# Patient Record
Sex: MALE | Race: WHITE | NOT HISPANIC OR LATINO | Employment: OTHER | URBAN - METROPOLITAN AREA
[De-identification: names, ages, dates, MRNs, and addresses within clinical notes are randomized per-mention and may not be internally consistent; named-entity substitution may affect disease eponyms.]

---

## 2021-05-18 ENCOUNTER — HOSPITAL ENCOUNTER (OUTPATIENT)
Dept: PHYSICAL THERAPY | Facility: CLINIC | Age: 76
Setting detail: RECURRING SERIES
Discharge: HOME | End: 2021-05-19
Attending: INTERNAL MEDICINE

## 2022-10-20 NOTE — PROGRESS NOTES
"Consult (Skin lesion on chest )            History of Present Illness  Mikey Webster is a 77 y.o. male who presents to [facility] as a consult  for chest skin lesion, he had a biopsy in august. Came back as basal cell carcinoma. Has history of basal cell on scalp, face and right arm. Non smoker. Does take aspirin. Sees Dr. Alicea as cardiologist. Patient is okay to do this procedure in office.    Subjective       Morphine  Allergies Reconciled.    Review of Systems    All review of system has been reviewed and it  is negative except the ones note above.     Objective     /96   Pulse 74   Temp 98.1 °F (36.7 °C)   Ht 182.9 cm (72\")   Wt 82.3 kg (181 lb 8 oz)   SpO2 96%   BMI 24.62 kg/m²     Body mass index is 24.62 kg/m².    Physical Exam   Cardiovascular: Normal rate.     Pulmonary/Chest  Effort normal.     Chest    2.5 cm x 2 cm red slightly raised scar on right side of chest    Result Review :       Procedures  RECON- IN OFFICE EXCISION OF BASAL CELL CARCINOMA ON RIGHT SIDE OF CHEST 1HR.       Assessment and Plan {CC Problem List  Visit Diagnosis  ROS  Review (Popup)  [employer] Maintenance  Quality  BestPractice  Medications  SmartSets  SnapShot Encounters  Media :23}     Diagnoses and all orders for this visit:    1. Basal cell carcinoma (BCC) of skin of right breast (Primary)        Plan:  Discussed risk and benefits of re-excision of basal cell carinoma including bleeding, infection,  and scarring. Patient wishes to proceed. Will submit to insurance and schedule for in-office excision. RTC for procedure.       Scribed by Marilee Pardo MA, acting as a scribe for MCKAYLA Ambrocio, 10/25/22 14:41 EDT.  MCKAYLA Ambrocio's signature on the note affirms that the note adequately documents the care provided.  Patient was given instructions and counseling regarding his condition. Please see specific information pulled into the AVS if " appropriate.     Kalani Covarrubias, APRN  10/25/2022

## 2022-10-25 ENCOUNTER — OFFICE VISIT (OUTPATIENT)
Dept: PLASTIC SURGERY | Facility: CLINIC | Age: 77
End: 2022-10-25

## 2022-10-25 VITALS
TEMPERATURE: 98.1 F | HEART RATE: 74 BPM | OXYGEN SATURATION: 96 % | WEIGHT: 181.5 LBS | SYSTOLIC BLOOD PRESSURE: 153 MMHG | DIASTOLIC BLOOD PRESSURE: 96 MMHG | HEIGHT: 72 IN | BODY MASS INDEX: 24.58 KG/M2

## 2022-10-25 DIAGNOSIS — C44.511 BASAL CELL CARCINOMA (BCC) OF SKIN OF RIGHT BREAST: Primary | ICD-10-CM

## 2022-10-25 PROBLEM — I10 HYPERTENSIVE DISORDER: Status: ACTIVE | Noted: 2021-11-09

## 2022-10-25 PROBLEM — E04.2 MULTINODULAR GOITER: Status: ACTIVE | Noted: 2021-05-06

## 2022-10-25 PROBLEM — Z86.73 HISTORY OF CEREBROVASCULAR ACCIDENT: Status: ACTIVE | Noted: 2021-07-13

## 2022-10-25 PROBLEM — I73.00 RAYNAUD'S DISEASE: Status: ACTIVE | Noted: 2018-02-13

## 2022-10-25 PROBLEM — M25.569 PAIN IN JOINT, LOWER LEG: Status: ACTIVE | Noted: 2022-10-25

## 2022-10-25 PROBLEM — E61.1 IRON DEFICIENCY: Status: ACTIVE | Noted: 2021-07-14

## 2022-10-25 PROBLEM — Z86.718 HISTORY OF DEEP VEIN THROMBOSIS: Status: ACTIVE | Noted: 2018-02-13

## 2022-10-25 PROCEDURE — 99204 OFFICE O/P NEW MOD 45 MIN: CPT | Performed by: NURSE PRACTITIONER

## 2022-10-25 RX ORDER — NIRMATRELVIR AND RITONAVIR 300-100 MG
KIT ORAL
COMMUNITY
Start: 2022-09-22 | End: 2022-11-14

## 2022-10-25 RX ORDER — DEXTROMETHORPHAN HYDROBROMIDE AND PROMETHAZINE HYDROCHLORIDE 15; 6.25 MG/5ML; MG/5ML
SYRUP ORAL
COMMUNITY
Start: 2022-09-28 | End: 2022-11-14

## 2022-10-25 RX ORDER — PREDNISONE 20 MG/1
TABLET ORAL
COMMUNITY
Start: 2022-09-23 | End: 2022-11-14

## 2022-10-25 RX ORDER — ASPIRIN 81 MG/1
TABLET ORAL
COMMUNITY

## 2022-10-25 RX ORDER — ASCORBIC ACID 100 MG
TABLET,CHEWABLE ORAL
COMMUNITY
End: 2022-11-29

## 2022-10-25 RX ORDER — AMOXICILLIN 500 MG/1
500 CAPSULE ORAL EVERY 8 HOURS
COMMUNITY
Start: 2022-10-07 | End: 2022-11-14

## 2022-10-25 RX ORDER — UBIDECARENONE 75 MG
CAPSULE ORAL
COMMUNITY
End: 2022-11-14

## 2022-10-25 RX ORDER — LISINOPRIL 20 MG/1
TABLET ORAL
COMMUNITY
End: 2022-11-29

## 2022-10-25 RX ORDER — ATORVASTATIN CALCIUM 40 MG/1
TABLET, FILM COATED ORAL
COMMUNITY
End: 2022-11-29

## 2022-10-25 RX ORDER — LOSARTAN POTASSIUM 100 MG/1
TABLET ORAL
COMMUNITY
Start: 2022-10-07

## 2022-10-25 RX ORDER — IBUPROFEN 800 MG/1
TABLET ORAL
COMMUNITY

## 2022-10-26 ENCOUNTER — PATIENT ROUNDING (BHMG ONLY) (OUTPATIENT)
Dept: PLASTIC SURGERY | Facility: CLINIC | Age: 77
End: 2022-10-26

## 2022-10-26 NOTE — PROGRESS NOTES
A [email or web address] message has been sent to the patient for PATIENT ROUNDING with [facility].

## 2022-11-07 NOTE — PROGRESS NOTES
"Procedure (In office excision of right chest basal cell)            History of Present Illness  Mikey Webster is a 77 y.o. male who presents to [facility] for in office excision of right chest skin lesion.      Subjective       Morphine  Allergies Reconciled.    Review of Systems    All review of system has been reviewed and it  is negative except the ones note above.     Objective     /92 (BP Location: Left arm, Patient Position: Sitting)   Pulse 56   Ht 182.9 cm (72.01\")   Wt 86 kg (189 lb 9.6 oz)   SpO2 97%   BMI 25.71 kg/m²     Body mass index is 25.71 kg/m².    Physical Exam   Cardiovascular: Normal rate.     Pulmonary/Chest  Effort normal.     Chest    2.5 cm x 2 cm red slightly raised scar on right side of chest    Result Review :       Procedures  Excision Procedure: Consent obtained. Local injected to site, Lidocaine 1% with epinephrine.  Site prepped with ChloraPrep  in sterile fashion. Site draped in sterile fashion. I dissected down through skin and subcutaneous tissue completely around  Visible lesion, after excision of  was sent from field for pathology. Established hemostasis with direct pressure. Site was thoroughly irrigated. Site closed with 3-0 monocryl in a subcutaneous fashion to obliterate dead space, then with 4-0 monocryl in an intracuticular fashion. Site cleaned with sterile normal saline. Steri-strips and mastisol applied. The patient tolerated the procedure well with no immediate complications. 3 cm in length       Assessment and Plan      Diagnoses and all orders for this visit:    1. Basal cell carcinoma (BCC) of skin of right breast (Primary)  -     Tissue Pathology Exam  -     traMADol (Ultram) 50 MG tablet; Take 1-2 tablets by mouth Every 6 (Six) Hours As Needed for Moderate Pain.  Dispense: 20 tablet; Refill: 0    Other orders  -     amoxicillin-clavulanate (Augmentin) 875-125 MG per tablet; Take 1 tablet by mouth 2 " (Two) Times a Day for 10 days.  Dispense: 20 tablet; Refill: 0        Plan:    Keep incision clean, dry, and intact. May shower after 48 hours. RTC 2 weeks. Antibiotics sent in per patient request due to knee replacement in the past. Ultram sent for moderate pain.             Scribed by MCKAYLA Ambrocio, acting as a scribe for MCKAYLA Ambrocio, 11/14/22 15:38 EST.  MCKAYLA Ambrocio's signature on the note affirms that the note adequately documents the care provided.    [phone] [id number]    Patient was given instructions and counseling regarding his condition. Please see specific information pulled into the AVS if appropriate.     MCKAYLA Ambrocio  11/14/2022

## 2022-11-14 ENCOUNTER — PROCEDURE VISIT (OUTPATIENT)
Dept: PLASTIC SURGERY | Facility: CLINIC | Age: 77
End: 2022-11-14

## 2022-11-14 VITALS
HEART RATE: 56 BPM | DIASTOLIC BLOOD PRESSURE: 92 MMHG | SYSTOLIC BLOOD PRESSURE: 149 MMHG | WEIGHT: 189.6 LBS | HEIGHT: 72 IN | OXYGEN SATURATION: 97 % | BODY MASS INDEX: 25.68 KG/M2

## 2022-11-14 DIAGNOSIS — C44.511 BASAL CELL CARCINOMA (BCC) OF SKIN OF RIGHT BREAST: Primary | ICD-10-CM

## 2022-11-14 PROCEDURE — 11603 EXC TR-EXT MAL+MARG 2.1-3 CM: CPT | Performed by: NURSE PRACTITIONER

## 2022-11-14 PROCEDURE — 88305 TISSUE EXAM BY PATHOLOGIST: CPT | Performed by: NURSE PRACTITIONER

## 2022-11-14 PROCEDURE — 12032 INTMD RPR S/A/T/EXT 2.6-7.5: CPT | Performed by: NURSE PRACTITIONER

## 2022-11-14 RX ORDER — AMOXICILLIN AND CLAVULANATE POTASSIUM 875; 125 MG/1; MG/1
1 TABLET, FILM COATED ORAL 2 TIMES DAILY
Qty: 20 TABLET | Refills: 0 | Status: SHIPPED | OUTPATIENT
Start: 2022-11-14 | End: 2022-11-24

## 2022-11-14 RX ORDER — TRAMADOL HYDROCHLORIDE 50 MG/1
50-100 TABLET ORAL EVERY 6 HOURS PRN
Qty: 20 TABLET | Refills: 0 | Status: SHIPPED | OUTPATIENT
Start: 2022-11-14 | End: 2022-11-29

## 2022-11-16 LAB
CYTO UR: NORMAL
LAB AP CASE REPORT: NORMAL
LAB AP CLINICAL INFORMATION: NORMAL
PATH REPORT.FINAL DX SPEC: NORMAL
PATH REPORT.GROSS SPEC: NORMAL

## 2022-11-17 ENCOUNTER — TELEPHONE (OUTPATIENT)
Dept: PLASTIC SURGERY | Facility: CLINIC | Age: 77
End: 2022-11-17

## 2022-11-17 NOTE — TELEPHONE ENCOUNTER
Called and spoke with patient over path results. Patient is aware and will see us at his scheduled office visit.

## 2022-11-17 NOTE — TELEPHONE ENCOUNTER
----- Message from MCKAYLA Ambrocio sent at 11/17/2022 10:33 AM EST -----  Please let him know margins negative so need for further excision

## 2022-11-23 NOTE — PROGRESS NOTES
"Chief Complaint  Follow-up (Steri strip removal)    Subjective          History of Present Illness  Mikey Webster is a 77 y.o. male who presents to [facility] for Postoperative Follow-Up of right chest skin lesion excision.    Here for steri strip removal. Denies pain. No open areas. Doing well.  Pathology:  Final Diagnosis   Skin, right breast, excision               - Basal cell carcinoma, margins negative            Allergies: Morphine  Allergies Reconciled.    Review of Systems   All review of system has been reviewed and it  is negative except the ones note above.     Objective     /76 (BP Location: Left arm, Patient Position: Sitting, Cuff Size: Adult)   Pulse (!) 48   Temp 98.4 °F (36.9 °C) (Temporal)   Ht 182.9 cm (72.01\")   Wt 85.1 kg (187 lb 9.6 oz)   SpO2 97%   BMI 25.44 kg/m²     Body mass index is 25.44 kg/m².    Physical Exam   Cardiovascular: Normal rate.     Pulmonary/Chest  Effort normal.      Chest  Incision healing well, no swelling, no erythema, no drainage    Result Review :                Assessment and Plan      Diagnoses and all orders for this visit:    1. Basal cell carcinoma (BCC) of skin of right breast (Primary)        Plan:  • Steri strips removed, antibiotic ointment applied, Telfa pad and tape. RTO in 2m for follow up. Patient reports he is seeing cardiology today due to low heart rate, take a betablocker and may stop it. He notes he feels fine today and has no symptoms. He will monitor skin for any new lesions.         Follow Up     Return for 2m for follow up.    Patient was given instructions and counseling regarding his condition. Please see specific information pulled into the AVS if appropriate.     Kalani Covarrubias, APRN  11/29/2022  "

## 2022-11-29 ENCOUNTER — OFFICE VISIT (OUTPATIENT)
Dept: PLASTIC SURGERY | Facility: CLINIC | Age: 77
End: 2022-11-29

## 2022-11-29 ENCOUNTER — OFFICE VISIT (OUTPATIENT)
Dept: CARDIOLOGY | Facility: CLINIC | Age: 77
End: 2022-11-29

## 2022-11-29 VITALS
BODY MASS INDEX: 25.41 KG/M2 | TEMPERATURE: 98.4 F | HEIGHT: 72 IN | WEIGHT: 187.6 LBS | SYSTOLIC BLOOD PRESSURE: 132 MMHG | DIASTOLIC BLOOD PRESSURE: 76 MMHG | OXYGEN SATURATION: 97 % | HEART RATE: 48 BPM

## 2022-11-29 VITALS
WEIGHT: 190 LBS | SYSTOLIC BLOOD PRESSURE: 135 MMHG | HEART RATE: 55 BPM | HEIGHT: 73 IN | BODY MASS INDEX: 25.18 KG/M2 | DIASTOLIC BLOOD PRESSURE: 70 MMHG

## 2022-11-29 DIAGNOSIS — C44.511 BASAL CELL CARCINOMA (BCC) OF SKIN OF RIGHT BREAST: Primary | ICD-10-CM

## 2022-11-29 DIAGNOSIS — Z86.73 HISTORY OF CVA IN ADULTHOOD: ICD-10-CM

## 2022-11-29 DIAGNOSIS — I10 ESSENTIAL HYPERTENSION: ICD-10-CM

## 2022-11-29 DIAGNOSIS — R00.0 TACHYCARDIA: Primary | ICD-10-CM

## 2022-11-29 PROCEDURE — 93000 ELECTROCARDIOGRAM COMPLETE: CPT | Performed by: INTERNAL MEDICINE

## 2022-11-29 PROCEDURE — 99203 OFFICE O/P NEW LOW 30 MIN: CPT | Performed by: INTERNAL MEDICINE

## 2022-11-29 PROCEDURE — 99212 OFFICE O/P EST SF 10 MIN: CPT | Performed by: NURSE PRACTITIONER

## 2022-11-29 NOTE — PROGRESS NOTES
Chief Complaint  Irregular Heart Beat    Subjective            Mikey Webster presents to [facility] CARD[facility]OLOG[facility]  History of Present Illness  Mr. Webster is a new patient who was referred here by his PCP (MCKAYLA Hendricks) due to tachycardia and some recent episodes of irregular heart beats.  He states he had COVID-19 infection in September and had elevated heart rates up to the 120s during that time, but did not experience any palpitations.  He did have significant generalized fatigue, but states he is now recovered well from COVID and has resumed all his normal physical activities without any problems.  He does not report any history of chest pain/pressure or exertional dyspnea.  Likewise, no orthopnea, PND, peripheral edema, lightheadedness, or syncope reported.  He was actually bradycardic in the office today and his ECG showed sinus bradycardia with a heart rate of 57 bpm and early R/S transition in the precordial leads, but was otherwise unremarkable.  Mr. Webster has not noticed any elevated heart rates/tachycardia in recent weeks, but he was started on beta blocker therapy with metoprolol tartrate after he had COVID.  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  No arrhythmias were observed on his telemetry monitor while he was hospitalized with the CVA, and he does not report any known history of arrhythmias or other cardiac problems.  An echocardiogram obtained at that time revealed normal left ventricular systolic function with an estimated ejection fraction of 60-65%, grade I diastolic dysfunction, and mild mitral regurgitation, but no hemodynamically significant valvular pathology.  His BP was 135/70 today.  Mr. Webster is a former smoker, but quit smoking in 2020.     Past Medical History:   Diagnosis Date   • Hypertension    • Stroke (HCC) 04/2021       No past surgical history on file.    Social History     Tobacco Use   • Smoking status: Former     Types:  Cigarettes     Quit date:      Years since quittin.9   • Smokeless tobacco: Never   Vaping Use   • Vaping Use: Never used   Substance Use Topics   • Alcohol use: Yes     Alcohol/week: 1.0 standard drink     Types: 1 Glasses of wine per week   • Drug use: Never       Family History   Problem Relation Age of Onset   • Lung cancer Father         Current Outpatient Medications on File Prior to Visit   Medication Sig   • aspirin 81 MG EC tablet aspirin 81 mg tablet,delayed release   Take 1 tablet every day by oral route.   • ibuprofen (ADVIL,MOTRIN) 800 MG tablet ibuprofen 800 mg tablet   TAKE 1 TABLET BY MOUTH EVERY 8 HOURS AS NEEDED FOR PAIN   • losartan (COZAAR) 100 MG tablet losartan 100 mg tablet   TAKE 1 TABLET BY MOUTH EVERY DAY   • metoprolol tartrate (LOPRESSOR) 25 MG tablet Every 12 (Twelve) Hours.   • [DISCONTINUED] Ascorbic Acid (Vitamin C) 100 MG chewable tablet Vitamin C   • [DISCONTINUED] atorvastatin (LIPITOR) 40 MG tablet atorvastatin 40 mg tablet   TAKE 1 TABLET BY MOUTH AT BEDTIME   • [DISCONTINUED] lisinopril (PRINIVIL,ZESTRIL) 20 MG tablet lisinopril 20 mg tablet   TAKE 1 TABLET BY MOUTH EVERY DAY   • [DISCONTINUED] metoprolol tartrate (LOPRESSOR) 25 MG tablet Take 1 tablet by mouth 2 (Two) Times a Day.   • [DISCONTINUED] traMADol (Ultram) 50 MG tablet Take 1-2 tablets by mouth Every 6 (Six) Hours As Needed for Moderate Pain.     No current facility-administered medications on file prior to visit.       Allergies   Allergen Reactions   • Morphine Other (See Comments)     Unsure, when in [employer], was told he had a reaction       Review of Systems   Constitutional: Negative for chills, fatigue, fever and unexpected weight gain.   HENT: Positive for trouble swallowing. Negative for hearing loss, nosebleeds and sore throat.    Eyes: Negative for pain and visual disturbance.   Respiratory: Positive for cough. Negative for shortness of breath and wheezing.    Cardiovascular: Negative for chest pain,  "palpitations and leg swelling.   Gastrointestinal: Negative for abdominal pain, blood in stool and vomiting.   Endocrine: Negative for cold intolerance and heat intolerance.   Genitourinary: Negative for dysuria and hematuria.   Musculoskeletal: Positive for arthralgias. Negative for joint swelling and myalgias.   Skin: Negative for color change, pallor and rash.   Neurological: Negative for tremors, syncope, speech difficulty, weakness and light-headedness.   Hematological: Negative for adenopathy. Does not bruise/bleed easily.        Objective     /70   Pulse 55   Ht 185.4 cm (73\")   Wt 86.2 kg (190 lb)   BMI 25.07 kg/m²       Physical Exam  Constitutional:       General: He is not in acute distress.     Appearance: Normal appearance.   HENT:      Head: Atraumatic.      Mouth/Throat:      Mouth: Mucous membranes are moist.      Pharynx: Oropharynx is clear. No oropharyngeal exudate.   Eyes:      General: No scleral icterus.     Conjunctiva/sclera: Conjunctivae normal.   Neck:      Vascular: No carotid bruit or JVD.   Cardiovascular:      Rate and Rhythm: Normal rate and regular rhythm.  No extrasystoles are present.     Chest Wall: PMI is not displaced.      Pulses:           Radial pulses are 2+ on the right side and 2+ on the left side.      Heart sounds: S1 normal and S2 normal. No murmur heard.    No friction rub. No gallop. No S3 or S4 sounds.   Pulmonary:      Effort: Pulmonary effort is normal. No tachypnea or respiratory distress.      Breath sounds: No decreased breath sounds, wheezing, rhonchi or rales.   Chest:      Chest wall: No tenderness.   Abdominal:      General: Bowel sounds are normal. There is no distension.      Palpations: Abdomen is soft. There is no mass.      Tenderness: There is no abdominal tenderness.   Musculoskeletal:         General: No swelling, tenderness or deformity.      Cervical back: Neck supple. No tenderness.      Right lower leg: No edema.      Left lower leg: No " edema.   Skin:     General: Skin is warm and dry.      Coloration: Skin is not jaundiced.      Findings: No erythema or rash.      Nails: There is no clubbing.   Neurological:      General: No focal deficit present.      Mental Status: He is alert and oriented to person, place, and time.      Motor: No weakness.   Psychiatric:         Mood and Affect: Mood normal.         Behavior: Behavior normal.       Result Review :     The following data was reviewed by: Db Alicea MD on 11/29/2022:    Echocardiogram 4/25/21:  CONCLUSIONS:    1.  Normal left ventricular systolic function with an estimated ejection        fraction of 60-65%.  No regional wall motion abnormalities were observed.        Mild concentric left ventricular hypertrophy.    2.  Grade 1 diastolic dysfunction with elevated left ventricular filling        pressure.    3.  Mildly dilated left atrium.    4.  Mild mitral regurgitation and mild tricuspid regurgitation, but no        hemodynamically significant valvular disease.      ECG 12 Lead    Date/Time: 11/29/2022 4:08 PM  Performed by: Db Alicea MD  Authorized by: Db Alicea MD   Comparison: compared with previous ECG from 4/25/2021  Similar to previous ECG  Rhythm: sinus bradycardia  Rate: bradycardic  BPM: 57  Conduction: conduction normal  ST Segments: ST segments normal  T Waves: T waves normal  QRS axis: normal  Other findings comments: early R/S transition in the precordial leads  Comments: Sinus bradycardia with a heart rate of 57 bpm and early R/S transition in the precordial leads.  No significant change was observed from the patient's prior ECG in April 2021.        Assessment and Plan      Diagnoses and all orders for this visit:    1. Tachycardia (Primary)  -     Cardiac Event Monitor; Future  -     ECG 12 Lead    2. History of CVA in adulthood  -     Cardiac Event Monitor; Future  -     ECG 12 Lead    3. Essential hypertension  -     ECG 12 Lead    -Recent  intermittent tachycardia with history of CVA in 2021:  I will obtain a 3-week event monitor to evaluate for potential paroxysmal arrhythmias, including atrial fibrillation.  If the event monitor is unremarkable, I would not recommend further cardiac testing at this time.  I recommended that he hold therapy with metoprolol for now due to baseline sinus bradycardia (with a heart rate down into the mid 40s at his other physician appointment earlier today).      -Hypertension:  BP adequately controlled on current therapy with losartan 100 mg daily; continue same.        Follow Up     No follow-ups on file.    Patient was given instructions and counseling regarding his condition or for health maintenance advice. Please see specific information pulled into the AVS if appropriate.     Mikey Webster  reports that he quit smoking about 2 years ago. His smoking use included cigarettes. He has never used smokeless tobacco.  I have educated him on the risk of diseases from using tobacco products such as cancer, COPD and heart disease.  Continued tobacco cessation was advised.      Db Alicea MD, FACC, [facility]  Interventional Cardiology

## 2022-12-02 ENCOUNTER — PATIENT ROUNDING (BHMG ONLY) (OUTPATIENT)
Dept: CARDIOLOGY | Facility: CLINIC | Age: 77
End: 2022-12-02

## 2022-12-02 NOTE — PROGRESS NOTES
December 2, 2022    Hello, may I speak with Mikey Webster?    My name is MORTEZA     I am  with [facility] CARDIOLOG[facility]  [address] [place] [address] [phone].    Before we get started may I verify your date of birth? 1945    I am calling to officially welcome you to our practice and ask about your recent visit. Is this a good time to talk? No, LEFT VM

## 2023-01-11 ENCOUNTER — TELEPHONE (OUTPATIENT)
Dept: CARDIOLOGY | Facility: CLINIC | Age: 78
End: 2023-01-11
Payer: MEDICARE

## 2023-01-11 NOTE — TELEPHONE ENCOUNTER
----- Message from Adwoa Roblero sent at 1/11/2023 10:50 AM EST -----    ----- Message -----  From: Radha Almonte APRN  Sent: 1/9/2023   8:27 PM EST  To: Adwoa Roblero    Mr. Webster is heart rate stayed in a normal sinus rhythm, with an average heart rate of 82 during his study.  He did have 2 short episodes of proximal SVT, the longest was 5 beats long.  There were no episodes of atrial fibrillation, atrial flutter, or ventricular arrhythmias.   If he was having heavy symptoms of palpitation or racing heart rate we could consider restarting his metoprolol, however Dr. Alicea did note when he was seen in November that his heart rate was on the lower side, so unless he is symptomatic, it would be reasonable to continue to hold the metoprolol.

## 2023-01-16 NOTE — PROGRESS NOTES
"Chief Complaint  Post-op Follow-up (2 month follow up from skin lesion excision on chest 11/14/22.)    Subjective          History of Present Illness  Mikey Webster is a 77 y.o. male who presents to [facility] for 2 month Postoperative Follow-Up of right chest skin lesion excision on 11/14/22.    Pt states no issues or concerns.  Healed nicely.    Clinical Information    Basal cell carcinoma (BCC) of skin of right breast   Final Diagnosis   Skin, right breast, excision               - Basal cell carcinoma, margins negative      Electronically signed by Marah Cheatham DO on 11/16/2022 at 1054   Gross Description    1. Breast, Right.  The specimen is received in 1 formalin filled container labeled \"right breast basal cell carcinoma\" and consists of a 3.0 x 1.6 cm unoriented skin ellipse excised to a depth of 0.3 cm.  The tan, wrinkled, hairbearing skin displays a 2.0 x 1.0 cm tan-white, flat, scarred lesion, involving the nearest skin margin (inked blue).  Sectioning reveals the lesion has a tan, ill-defined cut surface that grossly extends 0.3 cm deep, coming to within 0.2 cm of the deep margin.  The specimen is entirely submitted in 3 cassettes, to include the opposing tips in 1A and lesion involving the skin margin, perpendicular, in 1C. BLOSSOM           Allergies: Morphine  Allergies Reconciled.    Review of Systems   All review of system has been reviewed and it  is negative except the ones note above.     Objective     /79 (BP Location: Right arm, Patient Position: Sitting)   Pulse 51   Temp 98.7 °F (37.1 °C) (Temporal)   Ht 185.4 cm (72.99\")   Wt 89.4 kg (197 lb)   SpO2 98%   BMI 26.00 kg/m²     Body mass index is 26 kg/m².    Physical Exam   Cardiovascular: Normal rate.     Pulmonary/Chest  Effort normal.      Chest  Right breast incision healed well, flat and soft, no open areas, no lesion or growth at site    Result Review :              "   Assessment and Plan     Diagnoses and all orders for this visit:    1. Basal cell carcinoma (BCC) of skin of right breast (Primary)        Plan:  Photos obtained today. Start scar massage with Aquaphor. No sun exposure for up to 1 year. RTC as needed per patient request. Happy with results, will monitor for any reoccurrence, and will follow up if needed.    Scribed by Marilee Pardo MA, acting as a scribe for MCKAYLA Ambrocio, 01/24/23 11:15 EST.  MCKAYLA Ambrocio's signature on the note affirms that the note adequately documents the care provided.        Patient was given instructions and counseling regarding his condition. Please see specific information pulled into the AVS if appropriate.     Marilee Pardo MA  01/24/2023

## 2023-01-24 ENCOUNTER — OFFICE VISIT (OUTPATIENT)
Dept: PLASTIC SURGERY | Facility: CLINIC | Age: 78
End: 2023-01-24
Payer: MEDICARE

## 2023-01-24 VITALS
DIASTOLIC BLOOD PRESSURE: 79 MMHG | WEIGHT: 197 LBS | OXYGEN SATURATION: 98 % | HEART RATE: 51 BPM | BODY MASS INDEX: 26.11 KG/M2 | TEMPERATURE: 98.7 F | HEIGHT: 73 IN | SYSTOLIC BLOOD PRESSURE: 155 MMHG

## 2023-01-24 DIAGNOSIS — C44.511 BASAL CELL CARCINOMA (BCC) OF SKIN OF RIGHT BREAST: Primary | ICD-10-CM

## 2023-01-24 PROCEDURE — 99212 OFFICE O/P EST SF 10 MIN: CPT | Performed by: NURSE PRACTITIONER

## 2024-01-29 ENCOUNTER — OFFICE VISIT (OUTPATIENT)
Dept: CARDIOLOGY | Facility: CLINIC | Age: 79
End: 2024-01-29
Payer: MEDICARE

## 2024-01-29 VITALS
HEIGHT: 73 IN | BODY MASS INDEX: 28.23 KG/M2 | DIASTOLIC BLOOD PRESSURE: 72 MMHG | HEART RATE: 61 BPM | WEIGHT: 213 LBS | SYSTOLIC BLOOD PRESSURE: 134 MMHG

## 2024-01-29 DIAGNOSIS — R00.0 TACHYCARDIA: Primary | ICD-10-CM

## 2024-01-29 DIAGNOSIS — Z86.73 HISTORY OF CEREBROVASCULAR ACCIDENT: ICD-10-CM

## 2024-01-29 DIAGNOSIS — I10 ESSENTIAL HYPERTENSION: ICD-10-CM

## 2024-01-29 PROCEDURE — 1160F RVW MEDS BY RX/DR IN RCRD: CPT | Performed by: FAMILY MEDICINE

## 2024-01-29 PROCEDURE — 99214 OFFICE O/P EST MOD 30 MIN: CPT | Performed by: FAMILY MEDICINE

## 2024-01-29 PROCEDURE — 3075F SYST BP GE 130 - 139MM HG: CPT | Performed by: FAMILY MEDICINE

## 2024-01-29 PROCEDURE — 1159F MED LIST DOCD IN RCRD: CPT | Performed by: FAMILY MEDICINE

## 2024-01-29 PROCEDURE — 3078F DIAST BP <80 MM HG: CPT | Performed by: FAMILY MEDICINE

## 2024-01-29 PROCEDURE — 93000 ELECTROCARDIOGRAM COMPLETE: CPT | Performed by: FAMILY MEDICINE

## 2024-01-29 RX ORDER — ATORVASTATIN CALCIUM 40 MG/1
40 TABLET, FILM COATED ORAL DAILY
COMMUNITY

## 2024-01-29 NOTE — PROGRESS NOTES
Chief Complaint  Follow-up, Rapid Heart Rate, Hypertension, and [employer] clearance    Subjective        History of Present Illness  Mikey Webster presents to [facility] CARDIOLOGY   Mr. Webster is a 78-year-old male patient with medical history of hypertension, previous CVA in 2021, remote DVT, hyperlipidemia and history of tobacco use. He was previously seen and evaluated by Dr. Alicea for complaints of elevated heart rate following previous COVID infection.  Due to tachycardia following COVID, she was further treated with therapy, however this was stopped due to baseline sinus bradycardia.  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  He denies any arrhythmias on telemetry during that hospitalization, and he underwent subsequent cardiac event monitoring with no occult rhythms noted.  Cardiac wise- he has no complaints of chest pains, shortness of breath, palpitations, lightheadedness or dizziness.  He remains active, and has no residual effects from previous CVA.    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 No pertinent surgical history.     Social History  He  reports that he quit smoking about 3 years ago. His smoking use included cigarettes. He started smoking about 51 years ago. He has a 75.00 pack-year smoking history. He has never used smokeless tobacco. He reports current alcohol use of about 1.0 standard drink of alcohol per week. He reports that he does not use drugs.    Family History  His family history includes Lung cancer in his father.       Current Outpatient Medications on File Prior to Visit   Medication Sig    aspirin 81 MG EC tablet aspirin 81 mg tablet,delayed  "release   Take 1 tablet every day by oral route.    atorvastatin (LIPITOR) 40 MG tablet Take 1 tablet by mouth Daily.    losartan (COZAAR) 25 MG tablet Take 3 tablets by mouth Daily.    ibuprofen (ADVIL,MOTRIN) 800 MG tablet ibuprofen 800 mg tablet   TAKE 1 TABLET BY MOUTH EVERY 8 HOURS AS NEEDED FOR PAIN (Patient not taking: Reported on 1/29/2024)     No current facility-administered medications on file prior to visit.         Review of Systems   Constitutional:  Negative for fatigue.   Respiratory:  Negative for cough, chest tightness and shortness of breath.    Cardiovascular:  Negative for chest pain, palpitations and leg swelling.   Gastrointestinal:  Negative for nausea and vomiting.   Neurological:  Negative for dizziness and syncope.   Hematological:  Does not bruise/bleed easily.        Objective   Vitals:    01/29/24 1326   BP: 134/72   Pulse: 61   Weight: 96.6 kg (213 lb)   Height: 185.4 cm (72.99\")         Physical Exam  General : Alert, awake, no acute distress  Neck : Supple, no carotid bruit, no jugular venous distention  CVS : Regular rate and rhythm, normal S1 and S2, no murmur, rubs or gallops  Lungs: Clear to auscultation bilaterally, no crackles or rhonchi  Abdomen: Soft, nontender, bowel sounds active  Extremities: Warm, well-perfused, no pedal edema      Result Review     The following data was reviewed by MCKAYLA Olivo    Magnesium   Date Value Ref Range Status   04/26/2021 1.90 1.60 - 2.30 mg/dL Final      No results found for: \"DIGOXIN\"   Lab Results   Component Value Date    TROPONINT <0.01 04/25/2021            ECHOCARDIOGRAM    04/26/2021    CONCLUSIONS:    1.  Normal left ventricular systolic function with an estimated ejection        fraction of 60-65%.  No regional wall motion abnormalities were observed.        Mild concentric left ventricular hypertrophy.    2.  Grade 1 diastolic dysfunction with elevated left ventricular filling        pressure.    3.  Mildly dilated left " atrium.    4.  Mild mitral regurgitation and mild tricuspid regurgitation, but no        hemodynamically significant valvular disease.          Cardiac Event Monitor  11/30/2022  Sinus rhythm with an average heart rate of 82 bpm and rare sinus bradycardia and sinus tachycardia with minimum and maximum heart rates of 52 and 161 bpm, respectively.  Two short runs of paroxysmal SVT (probable atrial tachycardia) were observed, the longest of which was 5 beats in duration with a maximum heart rate of 130 bpm.  There was no evidence of atrial fibrillation/flutter or ventricular arrhythmias.  Rare isolated PVCs and rare isolated PACs and atrial couplets were observed.  No evidence of pauses or AV block.  The patient's reported symptoms (lightheadedness/syncope) occasionally correlated with sinus tachycardia, although there was not a consistent correlation.         ECG 12 Lead    Date/Time: 1/29/2024 1:37 PM  Performed by: Radha Almonte APRN    Authorized by: Radha Almonte APRN  Comparison: compared with previous ECG from 7/29/2022  Similar to previous ECG  Rhythm: sinus bradycardia  Rate: bradycardic  Conduction: conduction normal  ST Segments: ST segments normal  QRS axis: normal    Clinical impression: normal ECG                    Assessment and Plan   Diagnoses and all orders for this visit:    1. Tachycardia (Primary)  Assessment & Plan:  He had transient symptoms of tachycardia following COVID infection, which have resolved without any reoccurrence.  He underwent cardiac event monitor without any significant findings, beta-blocker was discontinued due to baseline bradycardia.  EKG in office today shows normal sinus rhythm with mild bradycardia.    Orders:  -     Cancel: ECG 12 Lead; Future  -     ECG 12 Lead    2. Essential hypertension  Assessment & Plan:  Blood pressure is well-controlled, continue current dose of losartan.  Previous echocardiogram with mild LVH, but no signs or symptoms of  decompensation.      3. History of cerebrovascular accident  Assessment & Plan:  History of right pontine CVA in April 2021.  No residual effects.  He had neurological workup as well as cardiac workup during hospitalization.  Cardiac workup did not show any occult rhythm contributing to CVA.  Continue daily aspirin and statin therapy per PCP management.              Follow Up   Return in about 1 year (around 1/29/2025) for with new MD d/t Dr Nixon huddleston.    Patient was given instructions and counseling regarding his condition or for health maintenance advice. Please see specific information pulled into the AVS if appropriate.     Signed,  Radha Almonte, APRN  01/29/2024     Dictated Utilizing Dragon Dictation: Please note that portions of this note were completed with a voice recognition program.  Part of this note may be an electronic transcription/translation of spoken language to printed text using the Dragon Dictation System.

## 2024-01-29 NOTE — LETTER
January 29, 2024       No Recipients    Patient: Mikey Webster   YOB: 1945   Date of Visit: 1/29/2024     Dear MCKAYLA Gar:       Thank you for referring Mikey Webster to me for evaluation. Below are the relevant portions of my assessment and plan of care.    If you have questions, please do not hesitate to call me. I look forward to following Mikey along with you.         Sincerely,        MCKAYLA Olivo        CC:   No Recipients    Radha Almonte APRN  01/29/24 1905  Addendum  Chief Complaint  Follow-up, Rapid Heart Rate, Hypertension, and [employer] clearance    Subjective       History of Present Illness  Mikey Webster presents to [facility] CARDIOLOGY   Mr. Webster is a 78-year-old male patient with medical history of hypertension, previous CVA in 2021, remote DVT, hyperlipidemia and history of tobacco use. He was previously seen and evaluated by Dr. Alicea for complaints of elevated heart rate following previous COVID infection.  Due to tachycardia following COVID, she was further treated with therapy, however this was stopped due to baseline sinus bradycardia.  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  He denies any arrhythmias on telemetry during that hospitalization, and he underwent subsequent cardiac event monitoring with no occult rhythms noted.  Cardiac wise- he has no complaints of chest pains, shortness of breath, palpitations, lightheadedness or dizziness.  He remains active, and has no residual effects from previous CVA.    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 No pertinent surgical history.     Social History  He  reports  "that he quit smoking about 3 years ago. His smoking use included cigarettes. He started smoking about 51 years ago. He has a 75.00 pack-year smoking history. He has never used smokeless tobacco. He reports current alcohol use of about 1.0 standard drink of alcohol per week. He reports that he does not use drugs.    Family History  His family history includes Lung cancer in his father.       Current Outpatient Medications on File Prior to Visit   Medication Sig   • aspirin 81 MG EC tablet aspirin 81 mg tablet,delayed release   Take 1 tablet every day by oral route.   • atorvastatin (LIPITOR) 40 MG tablet Take 1 tablet by mouth Daily.   • losartan (COZAAR) 25 MG tablet Take 3 tablets by mouth Daily.   • ibuprofen (ADVIL,MOTRIN) 800 MG tablet ibuprofen 800 mg tablet   TAKE 1 TABLET BY MOUTH EVERY 8 HOURS AS NEEDED FOR PAIN (Patient not taking: Reported on 1/29/2024)     No current facility-administered medications on file prior to visit.         Review of Systems   Constitutional:  Negative for fatigue.   Respiratory:  Negative for cough, chest tightness and shortness of breath.    Cardiovascular:  Negative for chest pain, palpitations and leg swelling.   Gastrointestinal:  Negative for nausea and vomiting.   Neurological:  Negative for dizziness and syncope.   Hematological:  Does not bruise/bleed easily.        Objective  Vitals:    01/29/24 1326   BP: 134/72   Pulse: 61   Weight: 96.6 kg (213 lb)   Height: 185.4 cm (72.99\")         Physical Exam  General : Alert, awake, no acute distress  Neck : Supple, no carotid bruit, no jugular venous distention  CVS : Regular rate and rhythm, normal S1 and S2, no murmur, rubs or gallops  Lungs: Clear to auscultation bilaterally, no crackles or rhonchi  Abdomen: Soft, nontender, bowel sounds active  Extremities: Warm, well-perfused, no pedal edema      Result Review    The following data was reviewed by MCKAYLA Olivo    Magnesium   Date Value Ref Range Status   04/26/2021 " "1.90 1.60 - 2.30 mg/dL Final      No results found for: \"DIGOXIN\"   Lab Results   Component Value Date    TROPONINT <0.01 04/25/2021            ECHOCARDIOGRAM    04/26/2021    CONCLUSIONS:    1.  Normal left ventricular systolic function with an estimated ejection        fraction of 60-65%.  No regional wall motion abnormalities were observed.        Mild concentric left ventricular hypertrophy.    2.  Grade 1 diastolic dysfunction with elevated left ventricular filling        pressure.    3.  Mildly dilated left atrium.    4.  Mild mitral regurgitation and mild tricuspid regurgitation, but no        hemodynamically significant valvular disease.          Cardiac Event Monitor  11/30/2022  Sinus rhythm with an average heart rate of 82 bpm and rare sinus bradycardia and sinus tachycardia with minimum and maximum heart rates of 52 and 161 bpm, respectively.  Two short runs of paroxysmal SVT (probable atrial tachycardia) were observed, the longest of which was 5 beats in duration with a maximum heart rate of 130 bpm.  There was no evidence of atrial fibrillation/flutter or ventricular arrhythmias.  Rare isolated PVCs and rare isolated PACs and atrial couplets were observed.  No evidence of pauses or AV block.  The patient's reported symptoms (lightheadedness/syncope) occasionally correlated with sinus tachycardia, although there was not a consistent correlation.         ECG 12 Lead    Date/Time: 1/29/2024 1:37 PM  Performed by: Radha Almotne APRN    Authorized by: Radha Almonte APRN  Comparison: compared with previous ECG from 7/29/2022  Similar to previous ECG  Rhythm: sinus bradycardia  Rate: bradycardic  Conduction: conduction normal  ST Segments: ST segments normal  QRS axis: normal    Clinical impression: normal ECG                    Assessment and Plan   Diagnoses and all orders for this visit:    1. Tachycardia (Primary)  Assessment & Plan:  He had transient symptoms of tachycardia following COVID " infection, which have resolved without any reoccurrence.  He underwent cardiac event monitor without any significant findings, beta-blocker was discontinued due to baseline bradycardia.  EKG in office today shows normal sinus rhythm with mild bradycardia.    Orders:  -     Cancel: ECG 12 Lead; Future  -     ECG 12 Lead    2. Essential hypertension  Assessment & Plan:  Blood pressure is well-controlled, continue current dose of losartan.  Previous echocardiogram with mild LVH, but no signs or symptoms of decompensation.      3. History of cerebrovascular accident  Assessment & Plan:  History of right pontine CVA in April 2021.  No residual effects.  He had neurological workup as well as cardiac workup during hospitalization.  Cardiac workup did not show any occult rhythm contributing to CVA.  Continue daily aspirin and statin therapy per PCP management.              Follow Up   Return in about 1 year (around 1/29/2025) for with new MD d/t Dr Nixon huddleston.    Patient was given instructions and counseling regarding his condition or for health maintenance advice. Please see specific information pulled into the AVS if appropriate.     Signed,  Radha Almonte, APRN  01/29/2024     Dictated Utilizing Dragon Dictation: Please note that portions of this note were completed with a voice recognition program.  Part of this note may be an electronic transcription/translation of spoken language to printed text using the Dragon Dictation System.

## 2024-01-30 NOTE — ASSESSMENT & PLAN NOTE
Blood pressure is well-controlled, continue current dose of losartan.  Previous echocardiogram with mild LVH, but no signs or symptoms of decompensation.  
He had transient symptoms of tachycardia following COVID infection, which have resolved without any reoccurrence.  He underwent cardiac event monitor without any significant findings, beta-blocker was discontinued due to baseline bradycardia.  EKG in office today shows normal sinus rhythm with mild bradycardia.  
History of right pontine CVA in April 2021.  No residual effects.  He had neurological workup as well as cardiac workup during hospitalization.  Cardiac workup did not show any occult rhythm contributing to CVA.  Continue daily aspirin and statin therapy per PCP management.  
patient

## 2024-04-03 ENCOUNTER — TRANSCRIBE ORDERS (OUTPATIENT)
Dept: ADMINISTRATIVE | Facility: HOSPITAL | Age: 79
End: 2024-04-03
Payer: MEDICARE

## 2024-04-03 DIAGNOSIS — I63.9 CEREBRAL INFARCTION, UNSPECIFIED MECHANISM: Primary | ICD-10-CM

## 2024-05-10 ENCOUNTER — HOSPITAL ENCOUNTER (OUTPATIENT)
Dept: MRI IMAGING | Facility: HOSPITAL | Age: 79
Discharge: HOME | End: 2024-05-10
Payer: MEDICARE

## 2024-05-10 DIAGNOSIS — I63.9 CEREBRAL INFARCTION, UNSPECIFIED MECHANISM: ICD-10-CM

## 2024-05-10 LAB
CREAT BLDA-MCNC: 0.6 MG/DL (ref 0.6–1.3)
EGFRCR SERPLBLD CKD-EPI 2021: 99.4 ML/MIN/1.73

## 2024-05-10 PROCEDURE — 0 GADOBENATE DIMEGLUMINE 529 MG/ML SOLUTION: Performed by: PSYCHIATRY & NEUROLOGY

## 2024-05-10 PROCEDURE — 70553 MRI BRAIN STEM W/O & W/DYE: CPT

## 2024-05-10 PROCEDURE — 82565 ASSAY OF CREATININE: CPT

## 2024-05-10 PROCEDURE — 70547 MR ANGIOGRAPHY NECK W/O DYE: CPT

## 2024-05-10 PROCEDURE — A9577 INJ MULTIHANCE: HCPCS | Performed by: PSYCHIATRY & NEUROLOGY

## 2024-05-10 RX ADMIN — GADOBENATE DIMEGLUMINE 20 ML: 529 INJECTION, SOLUTION INTRAVENOUS at 14:20

## 2024-06-26 ENCOUNTER — TELEPHONE (OUTPATIENT)
Dept: CARDIOLOGY | Facility: CLINIC | Age: 79
End: 2024-06-26
Payer: MEDICARE

## 2024-06-26 NOTE — TELEPHONE ENCOUNTER
Caller: Mikey Webster    Relationship: Self    Best call back number: [phone]    What form or medical record are you requesting: ALL OFFICE NOTES    Who is requesting this form or medical record from you: [employer]    How would you like to receive the form or medical records (pick-up, mail, fax):   If fax, what is the fax number:   If mail, what is the address:   If pick-up, provide patient with address and location details    Timeframe paperwork needed: ASAP    Additional notes: WILL BE IN [place] ON 6-27-24 IN THE AFTERNOON

## 2024-07-22 ENCOUNTER — APPOINTMENT (OUTPATIENT)
Dept: GENERAL RADIOLOGY | Facility: HOSPITAL | Age: 79
End: 2024-07-22
Payer: MEDICARE

## 2024-07-22 ENCOUNTER — HOSPITAL ENCOUNTER (EMERGENCY)
Facility: HOSPITAL | Age: 79
Discharge: HOME | End: 2024-07-22
Attending: EMERGENCY MEDICINE | Admitting: EMERGENCY MEDICINE
Payer: MEDICARE

## 2024-07-22 VITALS
DIASTOLIC BLOOD PRESSURE: 67 MMHG | HEART RATE: 64 BPM | WEIGHT: 206.35 LBS | OXYGEN SATURATION: 99 % | SYSTOLIC BLOOD PRESSURE: 154 MMHG | TEMPERATURE: 98 F | HEIGHT: 73 IN | BODY MASS INDEX: 27.35 KG/M2 | RESPIRATION RATE: 18 BRPM

## 2024-07-22 DIAGNOSIS — J32.9 SINUSITIS, UNSPECIFIED CHRONICITY, UNSPECIFIED LOCATION: ICD-10-CM

## 2024-07-22 DIAGNOSIS — J40 BRONCHITIS: Primary | ICD-10-CM

## 2024-07-22 LAB
FLUAV SUBTYP SPEC NAA+PROBE: NOT DETECTED
FLUBV RNA ISLT QL NAA+PROBE: NOT DETECTED
RSV RNA NPH QL NAA+NON-PROBE: NOT DETECTED
SARS-COV-2 RNA RESP QL NAA+PROBE: NOT DETECTED

## 2024-07-22 PROCEDURE — 99283 EMERGENCY DEPT VISIT LOW MDM: CPT

## 2024-07-22 PROCEDURE — 71045 X-RAY EXAM CHEST 1 VIEW: CPT

## 2024-07-22 PROCEDURE — 87637 SARSCOV2&INF A&B&RSV AMP PRB: CPT | Performed by: EMERGENCY MEDICINE

## 2024-07-22 RX ORDER — AZITHROMYCIN 250 MG/1
TABLET, FILM COATED ORAL
Qty: 6 TABLET | Refills: 0 | Status: SHIPPED | OUTPATIENT
Start: 2024-07-22 | End: 2024-07-26

## 2024-07-22 RX ORDER — BENZONATATE 200 MG/1
200 CAPSULE ORAL 3 TIMES DAILY PRN
Qty: 20 CAPSULE | Refills: 0 | Status: SHIPPED | OUTPATIENT
Start: 2024-07-22

## 2024-07-22 RX ORDER — PREDNISONE 50 MG/1
50 TABLET ORAL DAILY
Qty: 5 TABLET | Refills: 0 | Status: SHIPPED | OUTPATIENT
Start: 2024-07-22

## 2024-07-22 NOTE — ED PROVIDER NOTES
Time: 10:10 AM EDT  Date of encounter:  7/22/2024  Independent Historian/Clinical History and Information was obtained by:   Patient    History is limited by: N/A    Chief Complaint: Cough      History of Present Illness:  Patient is a 78 y.o. year old male who presents to the emergency department for evaluation of cough, nasal congestion that is gotten worse over the past 5 days.  Patient denies fever and chills.  Patient has no chest pain or shortness of breath.  Patient denies nausea, vomiting, and diarrhea.  Patient has no leg pain or swelling.    HPI    Patient Care Team  Primary Care Provider: Josefa Nieves APRN    Past Medical History:     Allergies   Allergen Reactions    Morphine Other (See Comments)     Unsure, when in [employer], was told he had a reaction     Past Medical History:   Diagnosis Date    Aneurysm 4/21    Cancer     Basel cell    Deep vein thrombosis August2005    Off blood thinners/ back to work 2/2006    Hypertension     Stroke 04/2021     No past surgical history on file.  Family History   Problem Relation Age of Onset    Lung cancer Father        Home Medications:  Prior to Admission medications    Medication Sig Start Date End Date Taking? Authorizing Provider   aspirin 81 MG EC tablet aspirin 81 mg tablet,delayed release   Take 1 tablet every day by oral route.    Darien Benton MD   atorvastatin (LIPITOR) 40 MG tablet Take 1 tablet by mouth Daily.    ProviderDarien MD   azithromycin (ZITHROMAX) 250 MG tablet Take 2 tablets by mouth Daily for 1 day, THEN 1 tablet Daily for 4 days. 7/22/24 7/26/24  Volodymyr Cruz MD   benzonatate (TESSALON) 200 MG capsule Take 1 capsule by mouth 3 (Three) Times a Day As Needed for Cough. 7/22/24   Volodymyr Cruz MD   ibuprofen (ADVIL,MOTRIN) 800 MG tablet ibuprofen 800 mg tablet   TAKE 1 TABLET BY MOUTH EVERY 8 HOURS AS NEEDED FOR PAIN  Patient not taking: Reported on 1/29/2024    ProviderDarien MD   losartan (COZAAR) 25 MG  "tablet Take 3 tablets by mouth Daily. 10/7/22   Provider, MD Darien   predniSONE (DELTASONE) 50 MG tablet Take 1 tablet by mouth Daily. 24   Volodymyr Cruz MD        Social History:   Social History     Tobacco Use    Smoking status: Former     Current packs/day: 0.00     Average packs/day: 1.5 packs/day for 50.0 years (75.0 ttl pk-yrs)     Types: Cigarettes     Start date: 2/15/1972     Quit date: 2020     Years since quittin.3    Smokeless tobacco: Never   Vaping Use    Vaping status: Never Used   Substance Use Topics    Alcohol use: Yes     Alcohol/week: 1.0 standard drink of alcohol     Types: 1 Glasses of wine per week    Drug use: Never         Review of Systems:  Review of Systems   Constitutional:  Negative for chills and fever.   HENT:  Positive for congestion. Negative for rhinorrhea and sore throat.    Eyes:  Negative for pain and visual disturbance.   Respiratory:  Positive for cough. Negative for apnea, chest tightness and shortness of breath.    Cardiovascular:  Negative for chest pain and palpitations.   Gastrointestinal:  Negative for abdominal pain, diarrhea, nausea and vomiting.   Genitourinary:  Negative for difficulty urinating and dysuria.   Musculoskeletal:  Negative for joint swelling and myalgias.   Skin:  Negative for color change.   Neurological:  Negative for seizures and headaches.   Psychiatric/Behavioral: Negative.     All other systems reviewed and are negative.       Physical Exam:  /50 (BP Location: Left arm, Patient Position: Sitting)   Pulse 64   Temp 98.1 °F (36.7 °C) (Oral)   Resp 18   Ht 185.4 cm (73\")   Wt 93.6 kg (206 lb 5.6 oz)   SpO2 99%   BMI 27.22 kg/m²     Physical Exam  Vitals and nursing note reviewed.   Constitutional:       General: He is not in acute distress.     Appearance: Normal appearance. He is not toxic-appearing.   HENT:      Head: Normocephalic and atraumatic.      Jaw: There is normal jaw occlusion.   Eyes:      General: " Lids are normal.      Extraocular Movements: Extraocular movements intact.      Conjunctiva/sclera: Conjunctivae normal.      Pupils: Pupils are equal, round, and reactive to light.   Cardiovascular:      Rate and Rhythm: Normal rate and regular rhythm.      Pulses: Normal pulses.      Heart sounds: Normal heart sounds.   Pulmonary:      Effort: Pulmonary effort is normal. No respiratory distress.      Breath sounds: Normal breath sounds. No wheezing or rhonchi.   Abdominal:      General: Abdomen is flat.      Palpations: Abdomen is soft.      Tenderness: There is no abdominal tenderness. There is no guarding or rebound.   Musculoskeletal:         General: Normal range of motion.      Cervical back: Normal range of motion and neck supple.      Right lower leg: No edema.      Left lower leg: No edema.   Skin:     General: Skin is warm and dry.   Neurological:      Mental Status: He is alert and oriented to person, place, and time. Mental status is at baseline.   Psychiatric:         Mood and Affect: Mood normal.                  Procedures:  Procedures      Medical Decision Making:      Comorbidities that affect care:    Hypertension    External Notes reviewed:    Previous Clinic Note: Patient was seen in clinic as follow-up for rapid heart rate      The following orders were placed and all results were independently analyzed by me:  Orders Placed This Encounter   Procedures    COVID-19, FLU A/B, RSV PCR 1 HR TAT - Swab, Nasopharynx    XR Chest 1 View       Medications Given in the Emergency Department:  Medications - No data to display     ED Course:         Labs:    Lab Results (last 24 hours)       Procedure Component Value Units Date/Time    COVID-19, FLU A/B, RSV PCR 1 HR TAT - Swab, Nasopharynx [[id number]]  (Normal) Collected: 07/22/24 0851    Specimen: Swab from Nasopharynx Updated: 07/22/24 0941     COVID19 Not Detected     Influenza A PCR Not Detected     Influenza B PCR Not Detected     RSV, PCR Not  Detected    Narrative:      Fact sheet for providers: https://www.fda.gov/media/151925/download    Fact sheet for patients: https://www.fda.gov/media/334521/download    Test performed by PCR.             Imaging:    XR Chest 1 View    Result Date: 7/22/2024  XR CHEST 1 VW Date of Exam: 7/22/2024 9:05 AM EDT Indication: chest congestion Comparison: 4/25/2021 Findings: Cardiomediastinal silhouette is within normal limits. No focal opacity, pleural effusion or pneumothorax. No evidence of acute osseous abnormalities. Visualized upper abdomen is unremarkable.     Impression: No radiographic evidence of acute cardiopulmonary process. Electronically Signed: Memo Jack MD  7/22/2024 9:30 AM EDT  Workstation ID: [id number]       Differential Diagnosis and Discussion:    Cough: Differential diagnosis includes but is not limited to pneumonia, acute bronchitis, upper respiratory infection, ACE inhibitor use, allergic reaction, epiglottitis, seasonal allergies, chemical irritants, exercise-induced asthma, viral syndrome.    All labs were reviewed and interpreted by me.  All X-rays impressions were independently interpreted by me.    MDM     The patient presented with a productive cough. The patient is well-appearing and in no respiratory distress. The patient has a normal mental status and is neurologically intact. The patient appears well and is able to tolerate food for fluid by mouth and there's no significant dehydration. There is no respiratory distress and no signs of systemic toxicity. The history, exam, diagnostic testing and current condition do not demonstrate an infectious process such as meningitis, severe pneumonia, retropharyngeal abscess, epiglottitis, sepsis or other serious bacterial infection requiring further testing, treatment, consultation, or admission at this time. The patient has no additional oxygen requirement nor are there any signs of respiratory distress. The patient has a chest x-ray  interpreted by me that is negative for pneumonia. Asthma, URI, GERD are also considered. The vital signs have been stable and the patient has had no hypoxia. The patient's condition is stable and appropriate for discharge. The patient will pursue further outpatient evaluation with the primary care physician, or designated physician. The patient will expressed a clear and thorough understanding and agreed to follow-up as instructed.          Patient Care Considerations:    PERC: I used the PERC score to risk stratify the patient for PE and a CT of the chest was considered but ultimately not indicated in today's visit.      Consultants/Shared Management Plan:    None    Social Determinants of Health:    Patient is independent, reliable, and has access to care.       Disposition and Care Coordination:    Discharged: The patient is suitable and stable for discharge with no need for consideration of admission.    I have explained the patient´s condition, diagnoses and treatment plan based on the information available to me at this time. I have answered questions and addressed any concerns. The patient has a good  understanding of the patient´s diagnosis, condition, and treatment plan as can be expected at this point. The vital signs have been stable. The patient´s condition is stable and appropriate for discharge from the emergency department.      The patient will pursue further outpatient evaluation with the primary care physician or other designated or consulting physician as outlined in the discharge instructions. They are agreeable to this plan of care and follow-up instructions have been explained in detail. The patient has received these instructions in written format and has expressed an understanding of the discharge instructions. The patient is aware that any significant change in condition or worsening of symptoms should prompt an immediate return to this or the closest emergency department or call to 911.  I  have explained discharge medications and the need for follow up with the patient/caretakers. This was also printed in the discharge instructions. Patient was discharged with the following medications and follow up:      Medication List        New Prescriptions      azithromycin 250 MG tablet  Commonly known as: ZITHROMAX  Take 2 tablets by mouth Daily for 1 day, THEN 1 tablet Daily for 4 days.  Start taking on: July 22, 2024     benzonatate 200 MG capsule  Commonly known as: TESSALON  Take 1 capsule by mouth 3 (Three) Times a Day As Needed for Cough.     predniSONE 50 MG tablet  Commonly known as: DELTASONE  Take 1 tablet by mouth Daily.               Where to Get Your Medications        These medications were sent to [facility], [facility], & [facility], [place] - [address] [facility] [address] - [phone] [facility] [phone]   [address] [facility] [address] [address]      Phone: [phone]   azithromycin 250 MG tablet  benzonatate 200 MG capsule  predniSONE 50 MG tablet      Josefa Nieves, APRN  [address]    [place] [address] [phone]    In 2 days         Final diagnoses:   Bronchitis   Sinusitis, unspecified chronicity, unspecified location        ED Disposition       ED Disposition   Discharge    Condition   Stable    Comment   --               This medical record created using voice recognition software.             Volodymyr Cruz MD  07/22/24 9280

## 2025-01-28 ENCOUNTER — TELEPHONE (OUTPATIENT)
Dept: CARDIOLOGY | Facility: CLINIC | Age: 80
End: 2025-01-28
Payer: MEDICARE

## 2025-01-28 NOTE — TELEPHONE ENCOUNTER
Caller: Mikey Webster    Relationship: Self    Best call back number: [phone]     What orders are you requesting (i.e. 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 PLEASE CALL PATIENT TO SCHEDULE. THANK YOU!

## 2025-01-29 DIAGNOSIS — Z86.73 HISTORY OF CEREBROVASCULAR ACCIDENT: ICD-10-CM

## 2025-01-29 DIAGNOSIS — R00.0 TACHYCARDIA: Primary | ICD-10-CM

## 2025-01-29 DIAGNOSIS — R94.31 ABNORMAL ELECTROCARDIOGRAM (ECG) (EKG): ICD-10-CM

## 2025-01-29 DIAGNOSIS — I10 ESSENTIAL HYPERTENSION: ICD-10-CM

## 2025-01-29 NOTE — TELEPHONE ENCOUNTER
I placed orders for a treadmill stress test, if he remains in the patient does not think he can complete a treadmill stress test please let me know and I can change the orders to a SPECT stress test, otherwise he should hear from scheduling in the next few business days to schedule his test.

## 2025-01-29 NOTE — TELEPHONE ENCOUNTER
Patient called, stating he needs a nuclear stress test with actual tracings to send to FAA- informed patient he can request a disc to send to FAA at the time of stress test

## 2025-02-03 DIAGNOSIS — Z86.73 HISTORY OF CEREBROVASCULAR ACCIDENT: ICD-10-CM

## 2025-02-03 DIAGNOSIS — I10 ESSENTIAL HYPERTENSION: ICD-10-CM

## 2025-02-03 DIAGNOSIS — R94.31 ABNORMAL ELECTROCARDIOGRAM (ECG) (EKG): ICD-10-CM

## 2025-02-03 DIAGNOSIS — R00.0 TACHYCARDIA: Primary | ICD-10-CM

## 2025-02-17 ENCOUNTER — HOSPITAL ENCOUNTER (OUTPATIENT)
Facility: HOSPITAL | Age: 80
Discharge: HOME | End: 2025-02-17
Admitting: FAMILY MEDICINE
Payer: MEDICARE

## 2025-02-17 VITALS — BODY MASS INDEX: 27.83 KG/M2 | HEIGHT: 73 IN | WEIGHT: 210 LBS

## 2025-02-17 DIAGNOSIS — R00.0 TACHYCARDIA: ICD-10-CM

## 2025-02-17 DIAGNOSIS — R94.31 ABNORMAL ELECTROCARDIOGRAM (ECG) (EKG): ICD-10-CM

## 2025-02-17 DIAGNOSIS — Z86.73 HISTORY OF CEREBROVASCULAR ACCIDENT: ICD-10-CM

## 2025-02-17 DIAGNOSIS — I10 ESSENTIAL HYPERTENSION: ICD-10-CM

## 2025-02-17 PROCEDURE — 93017 CV STRESS TEST TRACING ONLY: CPT

## 2025-02-18 LAB
BH CV IMMEDIATE POST RECOVERY TECH DATA SYMPTOMS: NORMAL
BH CV IMMEDIATE POST TECH DATA BLOOD PRESSURE: NORMAL MMHG
BH CV IMMEDIATE POST TECH DATA HEART RATE: 122 BPM
BH CV IMMEDIATE POST TECH DATA OXYGEN SATS: 98 %
BH CV NINE MINUTE RECOVERY TECH DATA BLOOD PRESSURE: NORMAL MMHG
BH CV NINE MINUTE RECOVERY TECH DATA HEART RATE: 79 BPM
BH CV NINE MINUTE RECOVERY TECH DATA OXYGEN SATURATION: 98 %
BH CV NINE MINUTE RECOVERY TECH DATA SYMPTOMS: NORMAL
BH CV SIX MINUTE RECOVERY TECH DATA BLOOD PRESSURE: NORMAL
BH CV SIX MINUTE RECOVERY TECH DATA HEART RATE: 102 BPM
BH CV SIX MINUTE RECOVERY TECH DATA OXYGEN SATURATION: 98 %
BH CV SIX MINUTE RECOVERY TECH DATA SYMPTOMS: NORMAL
BH CV STRESS BP STAGE 1: NORMAL
BH CV STRESS BP STAGE 2: NORMAL
BH CV STRESS BP STAGE 3: NORMAL
BH CV STRESS DURATION MIN STAGE 1: 3
BH CV STRESS DURATION MIN STAGE 2: 3
BH CV STRESS DURATION MIN STAGE 3: 1
BH CV STRESS DURATION SEC STAGE 1: 0
BH CV STRESS DURATION SEC STAGE 2: 0
BH CV STRESS DURATION SEC STAGE 3: 30
BH CV STRESS GRADE STAGE 1: 10
BH CV STRESS GRADE STAGE 2: 12
BH CV STRESS GRADE STAGE 3: 12
BH CV STRESS HR STAGE 1: 113
BH CV STRESS HR STAGE 2: 136
BH CV STRESS HR STAGE 3: 150
BH CV STRESS METS STAGE 1: 5
BH CV STRESS METS STAGE 2: 7.5
BH CV STRESS O2 STAGE 1: 98
BH CV STRESS O2 STAGE 2: 98
BH CV STRESS O2 STAGE 3: 98
BH CV STRESS PROTOCOL 1: NORMAL
BH CV STRESS RECOVERY BP: NORMAL MMHG
BH CV STRESS RECOVERY HR: 76 BPM
BH CV STRESS RECOVERY O2: 98 %
BH CV STRESS SPEED STAGE 1: 1.7
BH CV STRESS SPEED STAGE 2: 2.5
BH CV STRESS SPEED STAGE 3: 2.5
BH CV STRESS STAGE 1: 1
BH CV STRESS STAGE 2: 2
BH CV STRESS STAGE 3: 3
BH CV THREE MINUTE POST TECH DATA BLOOD PRESSURE: NORMAL MMHG
BH CV THREE MINUTE POST TECH DATA HEART RATE: 108 BPM
BH CV THREE MINUTE POST TECH DATA OXYGEN SATURATION: 98 %
MAXIMAL PREDICTED HEART RATE: 142 BPM
PERCENT MAX PREDICTED HR: 105.63 %
STRESS BASELINE BP: NORMAL MMHG
STRESS BASELINE HR: 70 BPM
STRESS O2 SAT REST: 98 %
STRESS PERCENT HR: 124 %
STRESS POST ESTIMATED WORKLOAD: 7.1 METS
STRESS POST EXERCISE DUR MIN: 7 MIN
STRESS POST EXERCISE DUR SEC: 33 SEC
STRESS POST O2 SAT PEAK: 98 %
STRESS POST PEAK BP: NORMAL MMHG
STRESS POST PEAK HR: 150 BPM
STRESS TARGET HR: 121 BPM

## 2025-02-19 ENCOUNTER — TELEPHONE (OUTPATIENT)
Dept: CARDIOLOGY | Facility: CLINIC | Age: 80
End: 2025-02-19
Payer: MEDICARE

## 2025-02-19 NOTE — TELEPHONE ENCOUNTER
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

## 2025-02-19 NOTE — TELEPHONE ENCOUNTER
JOSEFINA patient. Went over results and recommendations. Patient does require a letter from provider with specific information. Patient will provide information via [employer] or drop off. 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

## 2025-03-05 ENCOUNTER — TELEPHONE (OUTPATIENT)
Dept: CARDIOLOGY | Facility: CLINIC | Age: 80
End: 2025-03-05
Payer: MEDICARE

## 2025-03-05 NOTE — TELEPHONE ENCOUNTER
MESSAGE FROM THE PATIENT:    Here are the notes from my. Doctor regarding what the FAA needs from the Doctors      We basically want the provider to reference the test results to state that they look good and that, from their perspective, no additional testing or treatment is required.        1)    You will want to request the official Progress Notes from the appointment.  They may try to direct you to their online portal.  If so, you'll need to let them know the portal records aren't acceptable for FAA purposes.     With your authorization, they should be able to fax the official Progress Notes to us (fax # below), or you can include them with the rest of the records.        2)    Are you clear on the documentation from the stress test?          The FAA will require:      - overall physician summary report      - worksheet with heart rate and BP at each stage      - all of the ECG tracings from the treadmill portion (you said you already have those)     .  Kenan Lopez MD, MS  [facility], [address], [id number]  [place] [address] [phone]  Fax [phone] / [phone]

## 2025-03-13 ENCOUNTER — OFFICE VISIT (OUTPATIENT)
Dept: CARDIOLOGY | Facility: CLINIC | Age: 80
End: 2025-03-13
Payer: MEDICARE

## 2025-03-13 ENCOUNTER — TELEPHONE (OUTPATIENT)
Dept: CARDIOLOGY | Facility: CLINIC | Age: 80
End: 2025-03-13

## 2025-03-13 VITALS
BODY MASS INDEX: 27.7 KG/M2 | DIASTOLIC BLOOD PRESSURE: 108 MMHG | HEIGHT: 73 IN | HEART RATE: 54 BPM | WEIGHT: 209 LBS | SYSTOLIC BLOOD PRESSURE: 169 MMHG

## 2025-03-13 DIAGNOSIS — I10 ESSENTIAL HYPERTENSION: Primary | ICD-10-CM

## 2025-03-13 DIAGNOSIS — Z86.73 HISTORY OF CEREBROVASCULAR ACCIDENT: ICD-10-CM

## 2025-03-13 PROCEDURE — 1160F RVW MEDS BY RX/DR IN RCRD: CPT | Performed by: INTERNAL MEDICINE

## 2025-03-13 PROCEDURE — 99214 OFFICE O/P EST MOD 30 MIN: CPT | Performed by: INTERNAL MEDICINE

## 2025-03-13 PROCEDURE — 3077F SYST BP >= 140 MM HG: CPT | Performed by: INTERNAL MEDICINE

## 2025-03-13 PROCEDURE — 1159F MED LIST DOCD IN RCRD: CPT | Performed by: INTERNAL MEDICINE

## 2025-03-13 PROCEDURE — 3080F DIAST BP >= 90 MM HG: CPT | Performed by: INTERNAL MEDICINE

## 2025-03-13 RX ORDER — LOSARTAN POTASSIUM 25 MG/1
25 TABLET ORAL DAILY
Qty: 90 TABLET | Refills: 1 | Status: SHIPPED | OUTPATIENT
Start: 2025-03-13

## 2025-03-13 RX ORDER — ASPIRIN 81 MG/1
81 TABLET ORAL DAILY
Qty: 90 TABLET | Refills: 1 | Status: SHIPPED | OUTPATIENT
Start: 2025-03-13

## 2025-03-13 NOTE — TELEPHONE ENCOUNTER
PT CALLED AND SAID HE PICKED UP HIS PRESCRIPTIONS FROM PHARMACY.  HE THOUGHT DR. VERMA SAID HE WAS GOING TO SEND IN ATORVASTATIN 10 MGS.  HIS SCRIPT IS ACTUALLY 40 MGS.  THE ONLY TWO PRESCRIPTIONS WERE ASPIRIN AND LOSARTAN.      DR. VERMA'S NOTE SAYS NOTHING ABOUT A MEDICATION THAT IS 10 MGS.     AS FAR AS ATORVASTATIN, SEE OFFICE NOTE #2 FOR UPDATE.  WILL LET PT KNOW.

## 2025-03-16 NOTE — ASSESSMENT & PLAN NOTE
No residual deficits.  Previous cardiac workup unremarkable.  Encouraged to restart taking aspirin which she agreed.  He is currently not on statins as well.  [facility] as needed.

## 2025-03-16 NOTE — ASSESSMENT & PLAN NOTE
Pressure significantly elevated in office today and also during recent visit for exercise treadmill stress test.  Previous echocardiac showed LVH.  He is currently not on any antihypertensive medications.  Emphasized the need for adequate blood pressure control in people with history of stroke.  He agreed to restart the medications.  We will restart at the dose of 25 mg daily.  Encouraged to keep home blood pressure log.  Will bring back in 2 weeks for further titration of medications.

## 2025-03-16 NOTE — PROGRESS NOTES
CARDIOLOGY FOLLOW-UP PROGRESS NOTE        Chief Complaint  Follow-up and Hypertension    Subjective            Mikey Webster presents to [facility] CARDIOLOGY  History of Present Illness    Owen is here for a follow-up visit.  He was previously following up with Dr. Alicea, who moved out of practice.  He had a previous several vascular accident 2021 with no residual deficits.  Today he denies any new complaints.  No recent episodes of chest pain, shortness of breath, palpitations or dizziness.  He is currently not on any regular medications.      He was previously on 75 mg of losartan, the dose subsequently decreased due to low blood pressure.  For the past several months he is not taking any.  He also stopped taking aspirin and statins.  [facility] [place], [place].    Past History:    Right pontine cerebrovascular accident in April 2021    Medical History:  Past Medical History:   Diagnosis Date    Aneurysm 4/21    Cancer     Basel cell    Deep vein thrombosis August2005    Off blood thinners/ back to work 2/2006    Hypertension     Stroke 04/2021       Family History: family history includes Lung cancer in his father.     Social History: reports that he quit smoking about 4 years ago. His smoking use included cigarettes. He started smoking about 53 years ago. He has a 75 pack-year smoking history. He has never used smokeless tobacco. He reports current alcohol use of about 1.0 standard drink of alcohol per week. He reports that he does not use drugs.    Allergies: Morphine    Current Outpatient Medications on File Prior to Visit   Medication Sig   Not taking any regular medications at home      Review of Systems   Respiratory:  Negative for cough, shortness of breath and wheezing.    Cardiovascular:  Negative for chest pain, palpitations and leg swelling.   Gastrointestinal:  Negative for nausea and vomiting.   Neurological:  Negative for dizziness and  "syncope.        Objective     BP (!) 169/108   Pulse 54   Ht 185.4 cm (73\")   Wt 94.8 kg (209 lb)   BMI 27.57 kg/m²       Physical Exam    General : Alert, awake, no acute distress  Neck : Supple, no carotid bruit, no jugular venous distention  CVS : Regular rate and rhythm, no murmur, rubs or gallops  Lungs: Clear to auscultation bilaterally, no crackles or rhonchi  Abdomen: Soft, nontender, bowel sounds heard in all 4 quadrants  Extremities: Warm, well-perfused, no pedal edema    Result Review :     The following data was reviewed by: Edvin Lundy MD on 03/13/2025:    CMP          5/10/2024    14:16   CMP   Creatinine 0.60    EGFR 99.4                 Data reviewed: Cardiology studies      Echocardiogram done on 4/26/2021 showed    1.  Normal left ventricular systolic function with an estimated ejection        fraction of 60-65%.  No regional wall motion abnormalities were observed.        Mild concentric left ventricular hypertrophy.    2.  Grade 1 diastolic dysfunction with elevated left ventricular filling        pressure.    3.  Mildly dilated left atrium.    4.  Mild mitral regurgitation and mild tricuspid regurgitation, but no        hemodynamically significant valvular disease.       Results for orders placed during the hospital encounter of 02/17/25    Treadmill Stress Test    Interpretation Summary    Exercise treadmill stress is negative for ischemia.    Fair exercise tolerance noted.  Maximal workload achieved was 7.1 METS.    There was no chest pain at maximal exercise.    EKG at peak heart rate showed no diagnostic ischemic changes.               Assessment and Plan        Diagnoses and all orders for this visit:    1. Essential hypertension (Primary)  Assessment & Plan:  Pressure significantly elevated in office today and also during recent visit for exercise treadmill stress test.  Previous echocardiac showed LVH.  He is currently not on any antihypertensive medications.  Emphasized the " need for adequate blood pressure control in people with history of stroke.  He agreed to restart the medications.  We will restart at the dose of 25 mg daily.  Encouraged to keep home blood pressure log.  Will bring back in 2 weeks for further titration of medications.    Orders:  -     losartan (COZAAR) 25 MG tablet; Take 1 tablet by mouth Daily.  Dispense: 90 tablet; Refill: 1    2. History of cerebrovascular accident  Assessment & Plan:  No residual deficits.  Previous cardiac workup unremarkable.  Encouraged to restart taking aspirin which she agreed.  He is currently not on statins as well.  [facility] and initiate [facility]ins as needed.    Orders:  -     aspirin 81 MG EC tablet; Take 1 tablet by mouth Daily.  Dispense: 90 tablet; Refill: 1              Follow Up     Return in about 2 weeks (around 3/27/2025) for Next scheduled follow up, with Radha BLOCK.    Patient was given instructions and counseling regarding his condition or for health maintenance advice. Please see specific information pulled into the AVS if appropriate.

## 2025-04-02 ENCOUNTER — OFFICE VISIT (OUTPATIENT)
Dept: CARDIOLOGY | Facility: CLINIC | Age: 80
End: 2025-04-02
Payer: MEDICARE

## 2025-04-02 VITALS
HEART RATE: 72 BPM | HEIGHT: 73 IN | BODY MASS INDEX: 27.7 KG/M2 | WEIGHT: 209 LBS | SYSTOLIC BLOOD PRESSURE: 138 MMHG | DIASTOLIC BLOOD PRESSURE: 66 MMHG

## 2025-04-02 DIAGNOSIS — I10 ESSENTIAL HYPERTENSION: Primary | ICD-10-CM

## 2025-04-02 DIAGNOSIS — Z86.73 HISTORY OF CEREBROVASCULAR ACCIDENT: ICD-10-CM

## 2025-04-02 PROCEDURE — 3078F DIAST BP <80 MM HG: CPT | Performed by: FAMILY MEDICINE

## 2025-04-02 PROCEDURE — 3075F SYST BP GE 130 - 139MM HG: CPT | Performed by: FAMILY MEDICINE

## 2025-04-02 PROCEDURE — 1159F MED LIST DOCD IN RCRD: CPT | Performed by: FAMILY MEDICINE

## 2025-04-02 PROCEDURE — 99214 OFFICE O/P EST MOD 30 MIN: CPT | Performed by: FAMILY MEDICINE

## 2025-04-02 PROCEDURE — 1160F RVW MEDS BY RX/DR IN RCRD: CPT | Performed by: FAMILY MEDICINE

## 2025-04-02 RX ORDER — ATORVASTATIN CALCIUM 10 MG/1
10 TABLET, FILM COATED ORAL NIGHTLY
Qty: 90 TABLET | Refills: 1 | Status: SHIPPED | OUTPATIENT
Start: 2025-04-02

## 2025-04-02 RX ORDER — AMOXICILLIN 500 MG/1
1 CAPSULE ORAL 3 TIMES DAILY
COMMUNITY
Start: 2025-03-05 | End: 2025-04-16

## 2025-04-02 NOTE — PROGRESS NOTES
Chief Complaint  Follow-up, Rapid Heart Rate, and Med Management    Subjective        History of Present Illness  Mikey Webster presents to [facility] CARDIOLOG[facility]   Mr. Webster is a 79-year-old male patient coming in today for cardiac follow-up.  He is doing well, has no complaints of chest pains, shortness of breath, palpitations lightheadedness or dizziness.  He did have a low-dose chest CT for screening recently, does have some pulmonary nodules, and there was also a finding of cardiac coronary calcifications.  However he has no symptoms of angina, and recently underwent treadmill stress test (for licensure purposes ), and test was negative for ischemia.  At previous office visit, blood pressure was elevated, and he was restarted on losartan 25 mg daily.  Home blood pressure significantly improved.    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 No pertinent surgical history.     Social History  He  reports that he quit smoking about 5 years ago. His smoking use included cigarettes. He started smoking about 53 years ago. He has a 75 pack-year smoking history. He has never used smokeless tobacco. He reports current alcohol use of about 1.0 standard drink of alcohol per week. He reports that he does not use drugs.    Family History  His family history includes Lung cancer in his father.       Current Outpatient Medications on File Prior to Visit   Medication Sig    aspirin 81 MG EC tablet Take 1 tablet by mouth Daily.    losartan (COZAAR) 25 MG tablet Take 1 tablet by mouth Daily.    [DISCONTINUED] amoxicillin (AMOXIL) 500 MG capsule Take 1 capsule by mouth 3 times a day.  "    No current facility-administered medications on file prior to visit.         Review of Systems   Constitutional:  Negative for fatigue.   Respiratory:  Negative for cough, chest tightness and shortness of breath.    Cardiovascular:  Negative for chest pain, palpitations and leg swelling.   Gastrointestinal:  Negative for nausea and vomiting.   Neurological:  Negative for dizziness and syncope.        Objective   Vitals:    04/02/25 1425   BP: 138/66   Pulse: 72   Weight: 94.8 kg (209 lb)   Height: 185.4 cm (73\")         Physical Exam  General : Alert, awake, no acute distress  Neck : Supple, no carotid bruit, no jugular venous distention  CVS : Regular rate and rhythm, no murmur, no rubs or gallops  Lungs: Clear to auscultation bilaterally, no crackles or rhonchi  Abdomen: Soft, nontender, bowel sounds active  Extremities: Warm, well-perfused, no pedal edema      Result Review     The following data was reviewed by Radha Almonte, APRN  No results found for: \"PROBNP\"  CMP          5/10/2024    14:16   CMP   Creatinine 0.60    EGFR 99.4         No results found for: \"TSH\"   No results found for: \"FREET4\"   No results found for: \"DDIMERQUANT\"  Magnesium   Date Value Ref Range Status   04/26/2021 1.90 1.60 - 2.30 mg/dL Final      No results found for: \"DIGOXIN\"   Lab Results   Component Value Date    TROPONINT <0.01 04/25/2021             Echocardiogram done on 4/26/2021 showed     1.  Normal left ventricular systolic function with an estimated ejection        fraction of 60-65%.  No regional wall motion abnormalities were observed.        Mild concentric left ventricular hypertrophy.    2.  Grade 1 diastolic dysfunction with elevated left ventricular filling        pressure.    3.  Mildly dilated left atrium.    4.  Mild mitral regurgitation and mild tricuspid regurgitation, but no        hemodynamically significant valvular disease.      Results for orders placed during the hospital encounter of " 02/17/25    Treadmill Stress Test    Interpretation Summary    Exercise treadmill stress is negative for ischemia.    Fair exercise tolerance noted.  Maximal workload achieved was 7.1 METS.    There was no chest pain at maximal exercise.    EKG at peak heart rate showed no diagnostic ischemic changes.           Assessment and Plan   Diagnoses and all orders for this visit:    1. Essential hypertension (Primary)  Assessment & Plan:  Blood pressures are controlled after restarting losartan.  Continue losartan 25 mg daily.  Will request updated [facility] from [facility].      2. History of cerebrovascular accident  Assessment & Plan:  No residual defects.  His previous cardiac workup has been unremarkable, including stress test in February of this year.  Would recommend goal LDL below 70 due to previous stroke, will request labs from [facility] for review, and statins can be initiated once reviewed.      Other orders  -     atorvastatin (LIPITOR) 10 MG tablet; Take 1 tablet by mouth Every Night.  Dispense: 90 tablet; Refill: 1                Follow Up   Return in about 6 months (around 10/2/2025) for with Dr. Lundy  (please cancel May appointment).    Patient was given instructions and counseling regarding his condition or for health maintenance advice. Please see specific information pulled into the AVS if appropriate.     Signed,  Radha Almonte, APRN  04/02/2025     Dictated Utilizing Dragon Dictation: Please note that portions of this note were completed with a voice recognition program.  Part of this note may be an electronic transcription/translation of spoken language to printed text using the Dragon Dictation System.

## 2025-04-16 ENCOUNTER — DOCUMENTATION (OUTPATIENT)
Dept: CARDIOLOGY | Facility: CLINIC | Age: 80
End: 2025-04-16
Payer: MEDICARE

## 2025-04-16 NOTE — ASSESSMENT & PLAN NOTE
No residual defects.  His previous cardiac workup has been unremarkable, including stress test in February of this year.  Would recommend goal LDL below 70 due to previous stroke, will iniate low dose statin, and labs to be rechecked at [facility].

## 2025-04-16 NOTE — ASSESSMENT & PLAN NOTE
Blood pressures are controlled after restarting losartan.  Continue losartan 25 mg daily.  Will request updated labs from [facility].